# Patient Record
Sex: FEMALE | Race: BLACK OR AFRICAN AMERICAN | NOT HISPANIC OR LATINO | Employment: OTHER | URBAN - METROPOLITAN AREA
[De-identification: names, ages, dates, MRNs, and addresses within clinical notes are randomized per-mention and may not be internally consistent; named-entity substitution may affect disease eponyms.]

---

## 2019-04-09 ENCOUNTER — TELEPHONE (OUTPATIENT)
Dept: SURGERY | Facility: CLINIC | Age: 49
End: 2019-04-09

## 2019-04-09 NOTE — TELEPHONE ENCOUNTER
Left message today for patient to call me back to reschedule her 6/3/19 office visit with Dr. Manzo in preparation for her colonoscopy of 6/4/19 which needed to be rescheduled.  [facility] Man[facility] [place] [facility]

## 2019-06-06 ENCOUNTER — OFFICE VISIT (OUTPATIENT)
Dept: SURGERY | Facility: CLINIC | Age: 49
End: 2019-06-06
Payer: COMMERCIAL

## 2019-06-06 ENCOUNTER — HOSPITAL ENCOUNTER (OUTPATIENT)
Facility: HOSPITAL | Age: 49
End: 2019-06-06
Attending: COLON & RECTAL SURGERY | Admitting: COLON & RECTAL SURGERY
Payer: COMMERCIAL

## 2019-06-06 VITALS
SYSTOLIC BLOOD PRESSURE: 140 MMHG | WEIGHT: 194 LBS | DIASTOLIC BLOOD PRESSURE: 87 MMHG | HEART RATE: 73 BPM | HEIGHT: 66 IN | BODY MASS INDEX: 31.18 KG/M2

## 2019-06-06 DIAGNOSIS — R19.4 CHANGE IN BOWEL HABITS: ICD-10-CM

## 2019-06-06 DIAGNOSIS — Z12.11 COLON CANCER SCREENING: Primary | ICD-10-CM

## 2019-06-06 PROCEDURE — 99203 OFFICE O/P NEW LOW 30 MIN: CPT | Performed by: COLON & RECTAL SURGERY

## 2019-06-06 RX ORDER — SODIUM CHLORIDE 9 MG/ML
INJECTION, SOLUTION INTRAVENOUS CONTINUOUS
Status: CANCELLED | OUTPATIENT
Start: 2019-06-06 | End: 2019-06-07

## 2019-06-06 RX ORDER — PANTOPRAZOLE SODIUM 40 MG/1
40 TABLET, DELAYED RELEASE ORAL
Refills: 1 | COMMUNITY
Start: 2019-03-31

## 2019-06-06 ASSESSMENT — ENCOUNTER SYMPTOMS: ROS GI COMMENTS: CHANGE IN BOWEL HABITS

## 2019-06-06 NOTE — PROGRESS NOTES
"Colon and Rectal Surgery Consult    Subjective     Gabi Rubalcava is a 49 y.o. female who is due for colon cancer screening and is also had significant change in her bowel habits.    She has been having a change in her bowel habits over about the past 6 months.  She has some occasional constipation with straining and difficulty evacuation.  She gets some occasional waves of nausea prior to moving her bowels and then she gets better.    She was taking Metamucil to help her bowel function but then was having lower abdominal and rectal pain that resolved after she stopped the Metamucil.    She had a colonoscopy in September 2013.  A few polyps were removed, but they were not adenomas.    Medical History: History reviewed. No pertinent past medical history.    Surgical History:   Past Surgical History:   Procedure Laterality Date   • COLONOSCOPY         Social History:   Social History   Substance Use Topics   • Smoking status: Never Smoker   • Smokeless tobacco: Never Used   • Alcohol use Yes       Family History:   Family History   Problem Relation Age of Onset   • Cervical cancer Mother    • Diabetes Mother        Allergies: Patient has no known allergies.    Current Medications:  •  pantoprazole    Review of Systems  Review of Systems   Gastrointestinal:        Change in bowel habits   Allergic/Immunologic: Positive for environmental allergies.   All other systems reviewed and are negative.      Objective     Physicial Exam  /87 (BP Location: Right forearm, Patient Position: Sitting)   Pulse 73   Ht 1.676 m (5' 6\")   Wt 88 kg (194 lb)   Breastfeeding? No   BMI 31.31 kg/m²     Physical Exam   Constitutional: She is oriented to person, place, and time. She appears well-developed and well-nourished.   HENT:   Head: Normocephalic and atraumatic.   Eyes: Pupils are equal, round, and reactive to light. Conjunctivae and EOM are normal.   Neck: Normal range of motion. Neck supple.   Cardiovascular: Normal " rate, regular rhythm and normal heart sounds.    Pulmonary/Chest: Breath sounds normal. She has no wheezes.   Abdominal: Soft. She exhibits no distension and no mass. There is no tenderness.   Musculoskeletal: Normal range of motion.   Lymphadenopathy:     She has no cervical adenopathy.   Neurological: She is alert and oriented to person, place, and time. No cranial nerve deficit.   Skin: Skin is warm and dry. No rash noted.   Psychiatric: She has a normal mood and affect.   Vitals reviewed.          Labs  No new labs.    Imaging  Not applicable    Assessment     Problem List Items Addressed This Visit     Change in bowel habits    Current Assessment & Plan     Some of her bowel issues may be related to pantoprazole which was started sometime before she was having her symptoms started.  She also may be having perimenopausal symptoms which can contribute to change in bowel habits particularly with constipation.  When she has her screening colonoscopy, we will assess for any anatomic issues but I think the yield will be fairly low.         Colon cancer screening - Primary    Current Assessment & Plan     Ms. Rubalcava is due for colon cancer screening and we will plan a colonoscopy.We discussed the reason for colonoscopy and cancer screening.  We discussed the procedure and the risks and benefits of colonoscopy including the risk of perforation and bleeding from a polyp site.  We discussed bowel prep instructions.  she understands these issues and consents.           Relevant Orders    Case request operating room: COLONOSCOPY DIAGNOSTIC, POSS POLYPECTOMY FOR O.R. (Completed)                Tab Manzo MD

## 2019-06-06 NOTE — ASSESSMENT & PLAN NOTE
Some of her bowel issues may be related to pantoprazole which was started sometime before she was having her symptoms started.  She also may be having perimenopausal symptoms which can contribute to change in bowel habits particularly with constipation.  When she has her screening colonoscopy, we will assess for any anatomic issues but I think the yield will be fairly low.

## 2019-06-06 NOTE — LETTER
"    Dear Alfa,    I saw Gabi Rubalcava in the office today in consultation. Please see my note below.    If you have any additional questions, please do not hesitate to call me.    Sincerely,    Faizan Manzo MD      _____________________________________      Colon and Rectal Surgery Consult    Subjective     Gabi Rubalcava is a 49 y.o. female who is due for colon cancer screening and is also had significant change in her bowel habits.    She has been having a change in her bowel habits over about the past 6 months.  She has some occasional constipation with straining and difficulty evacuation.  She gets some occasional waves of nausea prior to moving her bowels and then she gets better.    She was taking Metamucil to help her bowel function but then was having lower abdominal and rectal pain that resolved after she stopped the Metamucil.    She had a colonoscopy in September 2013.  A few polyps were removed, but they were not adenomas.    Medical History: History reviewed. No pertinent past medical history.    Surgical History:   Past Surgical History:   Procedure Laterality Date   • COLONOSCOPY         Social History:   Social History   Substance Use Topics   • Smoking status: Never Smoker   • Smokeless tobacco: Never Used   • Alcohol use Yes       Family History:   Family History   Problem Relation Age of Onset   • Cervical cancer Mother    • Diabetes Mother        Allergies: Patient has no known allergies.    Current Medications:  •  pantoprazole    Review of Systems  Review of Systems   Gastrointestinal:        Change in bowel habits   Allergic/Immunologic: Positive for environmental allergies.   All other systems reviewed and are negative.      Objective     Physicial Exam  /87 (BP Location: Right forearm, Patient Position: Sitting)   Pulse 73   Ht 1.676 m (5' 6\")   Wt 88 kg (194 lb)   Breastfeeding? No   BMI 31.31 kg/m²      Physical Exam   Constitutional: She is oriented to person, " place, and time. She appears well-developed and well-nourished.   HENT:   Head: Normocephalic and atraumatic.   Eyes: Pupils are equal, round, and reactive to light. Conjunctivae and EOM are normal.   Neck: Normal range of motion. Neck supple.   Cardiovascular: Normal rate, regular rhythm and normal heart sounds.    Pulmonary/Chest: Breath sounds normal. She has no wheezes.   Abdominal: Soft. She exhibits no distension and no mass. There is no tenderness.   Musculoskeletal: Normal range of motion.   Lymphadenopathy:     She has no cervical adenopathy.   Neurological: She is alert and oriented to person, place, and time. No cranial nerve deficit.   Skin: Skin is warm and dry. No rash noted.   Psychiatric: She has a normal mood and affect.   Vitals reviewed.          Labs  No new labs.    Imaging  Not applicable    Assessment     Problem List Items Addressed This Visit     Change in bowel habits    Current Assessment & Plan     Some of her bowel issues may be related to pantoprazole which was started sometime before she was having her symptoms started.  She also may be having perimenopausal symptoms which can contribute to change in bowel habits particularly with constipation.  When she has her screening colonoscopy, we will assess for any anatomic issues but I think the yield will be fairly low.         Colon cancer screening - Primary    Current Assessment & Plan     Ms. Rubalcava is due for colon cancer screening and we will plan a colonoscopy.We discussed the reason for colonoscopy and cancer screening.  We discussed the procedure and the risks and benefits of colonoscopy including the risk of perforation and bleeding from a polyp site.  We discussed bowel prep instructions.  she understands these issues and consents.           Relevant Orders    Case request operating room: COLONOSCOPY DIAGNOSTIC, POSS POLYPECTOMY FOR O.R. (Completed)                Tab Manzo MD

## 2019-06-06 NOTE — ASSESSMENT & PLAN NOTE
Ms. Rubalcava is due for colon cancer screening and we will plan a colonoscopy.We discussed the reason for colonoscopy and cancer screening.  We discussed the procedure and the risks and benefits of colonoscopy including the risk of perforation and bleeding from a polyp site.  We discussed bowel prep instructions.  she understands these issues and consents.

## 2019-07-19 ENCOUNTER — HOSPITAL ENCOUNTER (OUTPATIENT)
Facility: HOSPITAL | Age: 49
End: 2019-07-19
Attending: COLON & RECTAL SURGERY | Admitting: COLON & RECTAL SURGERY
Payer: COMMERCIAL

## 2019-09-17 ENCOUNTER — TELEPHONE (OUTPATIENT)
Dept: ENDOSCOPY | Facility: HOSPITAL | Age: 49
End: 2019-09-17

## 2019-09-17 NOTE — TELEPHONE ENCOUNTER
Gabi called the office today to cancel her scheduled colonoscopy of 9/24/19.  Due to work issues, she states she needs to postpone for sometime the beginning of 2020.  She will keep her given colonoscopy prep instructions and contact our office when she can reschedule.

## 2020-04-07 ENCOUNTER — TELEPHONE (OUTPATIENT)
Dept: SURGERY | Facility: CLINIC | Age: 50
End: 2020-04-07

## 2020-04-07 NOTE — TELEPHONE ENCOUNTER
Spoke with patient for reminder of scheduling colonoscopy.  Due to COVID-19 pandemic arrangements might be made for late summer.  She will contact our office in the near future.

## 2021-04-23 ENCOUNTER — TRANSCRIBE ORDERS (OUTPATIENT)
Dept: SCHEDULING | Age: 51
End: 2021-04-23

## 2021-04-23 DIAGNOSIS — Z11.59 ENCOUNTER FOR SCREENING FOR OTHER VIRAL DISEASES: Primary | ICD-10-CM

## 2021-07-16 ENCOUNTER — TELEPHONE (OUTPATIENT)
Dept: SURGERY | Facility: CLINIC | Age: 51
End: 2021-07-16

## 2021-07-16 NOTE — TELEPHONE ENCOUNTER
Patient called the office to postpone her scheduled colonoscopy of 8/10/21.  She is caring for an uncle with a deteriorating medical condition at this timeframe.  She will contact our office later this Fall 2021 to reschedule.

## 2023-08-31 ENCOUNTER — CLAIMS CREATED FROM THE CLAIM WINDOW (OUTPATIENT)
Dept: URBAN - METROPOLITAN AREA CLINIC 118 | Facility: CLINIC | Age: 53
End: 2023-08-31
Payer: MEDICARE

## 2023-08-31 ENCOUNTER — LAB OUTSIDE AN ENCOUNTER (OUTPATIENT)
Dept: URBAN - METROPOLITAN AREA CLINIC 118 | Facility: CLINIC | Age: 53
End: 2023-08-31

## 2023-08-31 ENCOUNTER — DASHBOARD ENCOUNTERS (OUTPATIENT)
Age: 53
End: 2023-08-31

## 2023-08-31 VITALS
HEART RATE: 73 BPM | HEIGHT: 66 IN | WEIGHT: 207 LBS | DIASTOLIC BLOOD PRESSURE: 71 MMHG | TEMPERATURE: 98.1 F | SYSTOLIC BLOOD PRESSURE: 116 MMHG | BODY MASS INDEX: 33.27 KG/M2

## 2023-08-31 DIAGNOSIS — Z86.19 HISTORY OF HELICOBACTER PYLORI INFECTION: ICD-10-CM

## 2023-08-31 DIAGNOSIS — R19.4 CHANGE IN BOWEL HABITS: ICD-10-CM

## 2023-08-31 DIAGNOSIS — Z80.0 FAMILY HISTORY OF COLON CANCER IN FATHER: ICD-10-CM

## 2023-08-31 DIAGNOSIS — K21.9 CHRONIC GERD: ICD-10-CM

## 2023-08-31 PROBLEM — 235595009: Status: ACTIVE | Noted: 2023-08-31

## 2023-08-31 PROCEDURE — 99204 OFFICE O/P NEW MOD 45 MIN: CPT

## 2023-08-31 NOTE — HPI-TODAY'S VISIT:
Ms. Sow is a 52 y/o AAF who presents today for evaluation of several GI problems.  She decreased urge to have a BM in the past year. Will get a sudden urge right before, will have associated nausea and then symptoms will resolve after she has a BM. She reports incomplete emptying, but does have a BM every day. At times will have small, hard stools. Denies any GI bleeding or vomiting.  She also reports hx of chronic GERD. She takes Pantoprazole / tums prn (not daily) though symptoms occur daily. She believes she has hx of h. pylori. Denies dysphagia or unintentional weight loss.  Last colonoscopy was in 2013, per patient they removed polyps at that time. Her father was dx with CRC cancer recently (he is 84). No prior EGD. Denies NSAID use. No FH of gastric cancer or IBD.

## 2023-09-05 LAB — RESULT:: (no result)

## 2023-09-07 ENCOUNTER — TELEPHONE ENCOUNTER (OUTPATIENT)
Dept: URBAN - METROPOLITAN AREA CLINIC 118 | Facility: CLINIC | Age: 53
End: 2023-09-07

## 2023-09-12 ENCOUNTER — TELEPHONE ENCOUNTER (OUTPATIENT)
Dept: URBAN - METROPOLITAN AREA CLINIC 118 | Facility: CLINIC | Age: 53
End: 2023-09-12

## 2023-09-12 ENCOUNTER — LAB OUTSIDE AN ENCOUNTER (OUTPATIENT)
Dept: URBAN - METROPOLITAN AREA CLINIC 118 | Facility: CLINIC | Age: 53
End: 2023-09-12

## 2023-10-02 ENCOUNTER — OFFICE VISIT (OUTPATIENT)
Dept: URBAN - METROPOLITAN AREA SURGERY CENTER 23 | Facility: SURGERY CENTER | Age: 53
End: 2023-10-02
Payer: MEDICARE

## 2023-10-02 ENCOUNTER — CLAIMS CREATED FROM THE CLAIM WINDOW (OUTPATIENT)
Dept: URBAN - METROPOLITAN AREA CLINIC 4 | Facility: CLINIC | Age: 53
End: 2023-10-02
Payer: MEDICARE

## 2023-10-02 DIAGNOSIS — Z86.010 ADENOMAS PERSONAL HISTORY OF COLONIC POLYPS: ICD-10-CM

## 2023-10-02 DIAGNOSIS — K31.89 REACTIVE GASTROPATHY: ICD-10-CM

## 2023-10-02 DIAGNOSIS — R12 BURNING REFLUX: ICD-10-CM

## 2023-10-02 DIAGNOSIS — K21.00 GASTRO-ESOPHAGEAL REFLUX DISEASE WITH ESOPHAGITIS: ICD-10-CM

## 2023-10-02 DIAGNOSIS — K31.89 OTHER DISEASES OF STOMACH AND DUODENUM: ICD-10-CM

## 2023-10-02 DIAGNOSIS — Z09 ENCOUNTER FOR FOLLOW-UP EXAMINATION AFTER COMPLETED TREATMENT FOR CONDITIONS OTHER THAN MALIGNANT NEOPLASM: ICD-10-CM

## 2023-10-02 DIAGNOSIS — K29.70 GASTRITIS, UNSPECIFIED, WITHOUT BLEEDING: ICD-10-CM

## 2023-10-02 DIAGNOSIS — Z86.010 PERSONAL HISTORY OF COLONIC POLYP: ICD-10-CM

## 2023-10-02 DIAGNOSIS — K20.80 ESOPHAGITIS, LOS ANGELES GRADE B: ICD-10-CM

## 2023-10-02 DIAGNOSIS — K63.5 COLONIC POLYPS: ICD-10-CM

## 2023-10-02 PROCEDURE — 88305 TISSUE EXAM BY PATHOLOGIST: CPT | Performed by: PATHOLOGY

## 2023-10-02 PROCEDURE — 00813 ANES UPR LWR GI NDSC PX: CPT | Performed by: NURSE ANESTHETIST, CERTIFIED REGISTERED

## 2023-10-02 PROCEDURE — G0105 COLORECTAL SCRN; HI RISK IND: HCPCS | Performed by: INTERNAL MEDICINE

## 2023-10-02 PROCEDURE — 43239 EGD BIOPSY SINGLE/MULTIPLE: CPT | Performed by: INTERNAL MEDICINE

## 2023-10-02 PROCEDURE — 88342 IMHCHEM/IMCYTCHM 1ST ANTB: CPT | Performed by: PATHOLOGY

## 2023-10-02 PROCEDURE — G8907 PT DOC NO EVENTS ON DISCHARG: HCPCS | Performed by: INTERNAL MEDICINE

## 2023-10-02 PROCEDURE — 88341 IMHCHEM/IMCYTCHM EA ADD ANTB: CPT | Performed by: PATHOLOGY

## 2025-07-02 ENCOUNTER — OFFICE VISIT (OUTPATIENT)
Dept: URBAN - METROPOLITAN AREA CLINIC 118 | Facility: CLINIC | Age: 55
End: 2025-07-02
Payer: MEDICARE

## 2025-07-02 DIAGNOSIS — K59.00 COLONIC CONSTIPATION: ICD-10-CM

## 2025-07-02 DIAGNOSIS — M53.3 SACRAL PAIN: ICD-10-CM

## 2025-07-02 PROCEDURE — 99214 OFFICE O/P EST MOD 30 MIN: CPT

## 2025-07-02 RX ORDER — PANTOPRAZOLE SODIUM 40 MG/1
TAKE 1 TABLET BY MOUTH EVERY DAY IN THE MORNING TABLET, DELAYED RELEASE ORAL
Qty: 90 EACH | Refills: 3 | Status: ACTIVE | COMMUNITY

## 2025-07-02 NOTE — HPI-TODAY'S VISIT:
Ms. Sow is a 54 y/o female who presents today for evaluation.  Patient reports sensation of heaviness in her lower buttocks, but not quite in her rectal area. Occurs primarily in the morning upon waking, which occasionally makes it difficult for her to get out of bed. The symptoms have been present for the past 4-6 months and occur a few times per month, but not daily or even weekly. She occasionally will have some radiation of pain to her lower extremities. She notes that her bowel movements typically occur each morning, though she has experienced some difficulty passing stool though this does not appear to be related to the pain. She denies any blood in the stool, abdominal pain, rectal pain or unintentional weight loss. She has an upcoming appointment with her spine doctor for further evaluation of potentially related radiating pain down her legs as well. She is currently wearing a boot on her left foot.  -------------------------------- Prior OV 8/31/23 Ms. Sow is a 54 y/o AAF who presents today for evaluation of several GI problems.  She decreased urge to have a BM in the past year. Will get a sudden urge right before, will have associated nausea and then symptoms will resolve after she has a BM. She reports incomplete emptying, but does have a BM every day. At times will have small, hard stools. Denies any GI bleeding or vomiting.  She also reports hx of chronic GERD. She takes Pantoprazole / tums prn (not daily) though symptoms occur daily. She believes she has hx of h. pylori. Denies dysphagia or unintentional weight loss.  Last colonoscopy was in 2013, per patient they removed polyps at that time. Her father was dx with CRC cancer recently (he is 84). No prior EGD. Denies NSAID use. No FH of gastric cancer or IBD.

## 2025-07-14 PROBLEM — 61486003: Status: ACTIVE | Noted: 2025-07-14

## 2025-07-14 PROBLEM — 35298007: Status: ACTIVE | Noted: 2025-07-14
